# Patient Record
Sex: MALE | Race: WHITE | Employment: UNEMPLOYED | URBAN - METROPOLITAN AREA
[De-identification: names, ages, dates, MRNs, and addresses within clinical notes are randomized per-mention and may not be internally consistent; named-entity substitution may affect disease eponyms.]

---

## 2017-01-03 ENCOUNTER — TELEPHONE (OUTPATIENT)
Dept: NEUROLOGY | Facility: CLINIC | Age: 47
End: 2017-01-03

## 2017-01-03 NOTE — TELEPHONE ENCOUNTER
Patient is scheduled for an appointment regarding headaches 1/4/17.  Per Dr. Suarez's headache protocol, he does need to be seen by his primary first, and he or she should refer him.  She doesn't accept self-referrals for headaches.  I don't see that he's seen anyone in our system regarding this.      I left a message to have him return my call.

## 2017-01-26 ENCOUNTER — OFFICE VISIT (OUTPATIENT)
Dept: FAMILY MEDICINE | Facility: CLINIC | Age: 47
End: 2017-01-26
Payer: MEDICARE

## 2017-01-26 VITALS
HEIGHT: 70 IN | BODY MASS INDEX: 41.46 KG/M2 | WEIGHT: 289.6 LBS | TEMPERATURE: 96.1 F | DIASTOLIC BLOOD PRESSURE: 68 MMHG | HEART RATE: 60 BPM | SYSTOLIC BLOOD PRESSURE: 112 MMHG

## 2017-01-26 DIAGNOSIS — J01.90 ACUTE SINUSITIS WITH SYMPTOMS > 10 DAYS: ICD-10-CM

## 2017-01-26 DIAGNOSIS — G43.819 OTHER MIGRAINE WITHOUT STATUS MIGRAINOSUS, INTRACTABLE: ICD-10-CM

## 2017-01-26 DIAGNOSIS — R07.9 CHEST PAIN, UNSPECIFIED TYPE: Primary | ICD-10-CM

## 2017-01-26 DIAGNOSIS — G47.00 INSOMNIA, UNSPECIFIED TYPE: ICD-10-CM

## 2017-01-26 PROCEDURE — 99215 OFFICE O/P EST HI 40 MIN: CPT | Performed by: PHYSICIAN ASSISTANT

## 2017-01-26 RX ORDER — DIVALPROEX SODIUM 500 MG/1
TABLET, EXTENDED RELEASE ORAL
COMMUNITY
Start: 2016-11-09

## 2017-01-26 RX ORDER — ONDANSETRON 4 MG/1
4 TABLET, ORALLY DISINTEGRATING ORAL
COMMUNITY
Start: 2016-10-21

## 2017-01-26 RX ORDER — ASPIRIN 81 MG/1
81 TABLET ORAL DAILY
COMMUNITY
Start: 2017-01-26

## 2017-01-26 RX ORDER — DIVALPROEX SODIUM 500 MG/1
1500 TABLET, EXTENDED RELEASE ORAL DAILY
Qty: 90 TABLET | Refills: 3 | Status: SHIPPED | OUTPATIENT
Start: 2017-01-26

## 2017-01-26 RX ORDER — NAPROXEN 500 MG/1
500 TABLET ORAL
COMMUNITY
Start: 2016-09-24

## 2017-01-26 RX ORDER — TAMSULOSIN HYDROCHLORIDE 0.4 MG/1
0.4 CAPSULE ORAL DAILY
Qty: 60 CAPSULE | COMMUNITY
Start: 2017-01-26

## 2017-01-26 RX ORDER — LORAZEPAM 1 MG/1
1 TABLET ORAL
COMMUNITY
Start: 2016-11-09

## 2017-01-26 RX ORDER — MULTIVIT-MIN/IRON FUM/FOLIC AC 7.5 MG-4
2 TABLET ORAL
COMMUNITY

## 2017-01-26 RX ORDER — HYDROXYZINE PAMOATE 50 MG/1
50 CAPSULE ORAL
COMMUNITY
Start: 2016-11-09

## 2017-01-26 ASSESSMENT — ENCOUNTER SYMPTOMS
WHEEZING: 0
HEADACHES: 1
SPUTUM PRODUCTION: 0
PHOTOPHOBIA: 0
HALLUCINATIONS: 0
COUGH: 1
DYSURIA: 0
TREMORS: 0
CONSTIPATION: 0
PSYCHIATRIC NEGATIVE: 1
DIZZINESS: 0
CHILLS: 0
FOCAL WEAKNESS: 0
WEAKNESS: 0
STRIDOR: 0
PALPITATIONS: 0
EYE DISCHARGE: 0
EYE PAIN: 0
BLURRED VISION: 0
TINGLING: 1
FEVER: 0
ORTHOPNEA: 0
INSOMNIA: 0
DIARRHEA: 0
MYALGIAS: 0
NECK PAIN: 0
SHORTNESS OF BREATH: 0
SENSORY CHANGE: 0
NERVOUS/ANXIOUS: 0
SPEECH CHANGE: 0
DOUBLE VISION: 0
HEARTBURN: 0
NAUSEA: 0
BACK PAIN: 0
DIAPHORESIS: 0
MUSCULOSKELETAL NEGATIVE: 1
HEMOPTYSIS: 0
EYE REDNESS: 0
SORE THROAT: 0
DEPRESSION: 0
LOSS OF CONSCIOUSNESS: 0
VOMITING: 0
FREQUENCY: 0
BLOOD IN STOOL: 0
ABDOMINAL PAIN: 0
SEIZURES: 0
WEIGHT LOSS: 0

## 2017-01-26 ASSESSMENT — LIFESTYLE VARIABLES: SUBSTANCE_ABUSE: 0

## 2017-01-26 NOTE — NURSING NOTE
"Chief Complaint   Patient presents with     Heart Problem     Numbness     Establish Care     /68 mmHg  Pulse 60  Temp(Src) 96.1  F (35.6  C) (Tympanic)  Ht 5' 9.75\" (1.772 m)  Wt 289 lb 9.6 oz (131.362 kg)  BMI 41.84 kg/m2 Estimated body mass index is 41.84 kg/(m^2) as calculated from the following:    Height as of this encounter: 5' 9.75\" (1.772 m).    Weight as of this encounter: 289 lb 9.6 oz (131.362 kg).  bp completed using cuff size: large      Health Maintenance that is potentially due pending provider review:  NONE    n/a    Marleen WEN CMA    "
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
## 2017-01-26 NOTE — MR AVS SNAPSHOT
After Visit Summary   1/26/2017    Ambrose Jenkins    MRN: [id number]           Patient Information     Date Of Birth          1970        Visit Information        Provider Department      1/26/2017 3:20 PM Carrington Willingham PA-C [facility]        Today's Diagnoses     Chest pain, unspecified type    -  1     Insomnia, unspecified type         Other migraine without status migrainosus, intractable         Acute sinusitis with symptoms > 10 days            Follow-ups after your visit        Additional Services     OTOLARYNGOLOGY REFERRAL       Your provider has referred you to: [facility]: [facility] [phone]   [email or web address]    Please be aware that coverage of these services is subject to the terms and limitations of your health insurance plan.  Call member services at your health plan with any benefit or coverage questions.      Please bring the following with you to your appointment:    (1) Any X-Rays, CTs or MRIs which have been performed.  Contact the facility where they were done to arrange for  prior to your scheduled appointment.   (2) List of current medications  (3) This referral request   (4) Any documents/labs given to you for this referral            SLEEP EVALUATION & MANAGEMENT REFERRAL - ADULT       Please be aware that coverage of these services is subject to the terms and limitations of your health insurance plan.  Call member services at your health plan with any benefit or coverage questions.      Please bring the following to your appointment:    >>   List of current medications   >>   This referral request   >>   Any documents/labs given to you for this referral    [facility] Lab  Ph [phone] (Age 2 and up)                  Your next 10 appointments already scheduled     Feb 16, 2017 11:00 AM   New Visit with Kamila Suarez MD   [facility]  "   [address]  [facility] [address]   [phone]              Future tests that were ordered for you today     Open Future Orders        Priority Expected Expires Ordered    SLEEP EVALUATION & MANAGEMENT REFERRAL - ADULT Routine  1/26/2018 1/26/2017    Exercise Stress test Routine  3/12/2017 1/26/2017    Lipid Profile with reflex to direct LDL Routine  1/26/2018 1/26/2017    CBC with platelets and differential Routine  1/26/2018 1/26/2017    Comprehensive metabolic panel (BMP + Alb, Alk Phos, ALT, AST, Total. Bili, TP) Routine  1/26/2018 1/26/2017            Who to contact     If you have questions or need follow up information about today's clinic visit or your schedule please contact [facility] directly at [phone].  Normal or non-critical lab and imaging results will be communicated to you by MyChart, letter or phone within 4 business days after the clinic has received the results. If you do not hear from us within 7 days, please contact the clinic through [facility]Chart or phone. If you have a critical or abnormal lab result, we will notify you by phone as soon as possible.  Submit refill requests through [email or web address] or call your pharmacy and they will forward the refill request to us. Please allow 3 business days for your refill to be completed.          Additional Information About Your Visit        TNChart Information     [email or web address] lets you send messages to your doctor, view your test results, renew your prescriptions, schedule appointments and more. To sign up, go to [email or web address] . Click on \"Log in\" on the left side of the screen, which will take you to the Welcome page. Then click on \"Sign up Now\" on the right side of the page.     You will be asked to enter the access code listed below, as well as some personal information. Please follow the directions to create your username and password.     Your access code is: [email or web address]  Expires: 4/26/2017  4:08 PM     Your " "access code will  in 90 days. If you need help or a new code, please call your [place] clinic or [phone].        Care EveryWhere ID     This is your Care EveryWhere ID. This could be used by other organizations to access your [place] medical records  [id number]        Your Vitals Were     Pulse Temperature Height BMI (Body Mass Index)          60 96.1  F (35.6  C) (Tympanic) 5' 9.75\" (1.772 m) 41.84 kg/m2         Blood Pressure from Last 3 Encounters:   17 112/68   10/04/16 159/101   14 158/101    Weight from Last 3 Encounters:   17 289 lb 9.6 oz (131.362 kg)   03/10/16 279 lb 3.2 oz (126.644 kg)   10/10/14 252 lb (114.306 kg)              We Performed the Following     OTOLARYNGOLOGY REFERRAL          Today's Medication Changes          These changes are accurate as of: 17  4:08 PM.  If you have any questions, ask your nurse or doctor.               These medicines have changed or have updated prescriptions.        Dose/Directions    * divalproex 500 MG 24 hr tablet   Commonly known as:  DEPAKOTE ER   This may have changed:  Another medication with the same name was added. Make sure you understand how and when to take each.   Changed by:  Carrington Willingham PA-C        Refills:  0       * divalproex 500 MG 24 hr tablet   Commonly known as:  DEPAKOTE ER   This may have changed:  You were already taking a medication with the same name, and this prescription was added. Make sure you understand how and when to take each.   Used for:  Other migraine without status migrainosus, intractable   Changed by:  Carrington Willingham PA-C        Dose:  1500 mg   Take 3 tablets (1,500 mg) by mouth daily   Quantity:  90 tablet   Refills:  3       * Notice:  This list has 2 medication(s) that are the same as other medications prescribed for you. Read the directions carefully, and ask your doctor or other care provider to review them with you.         Where to get your medicines      These medications " were sent to [id number] - [place], [place] - [address] AT [facility] [address]  [address] [address]     Phone:  [phone]    - divalproex 500 MG 24 hr tablet             Primary Care Provider Office Phone # Fax #    Carrington Willingham PA-C [phone]       [facility] [address] [address]        Thank you!     Thank you for choosing [facility]  for your care. Our goal is always to provide you with excellent care. Hearing back from our patients is one way we can continue to improve our services. Please take a few minutes to complete the written survey that you may receive in the mail after your visit with us. Thank you!             Your Updated Medication List - Protect others around you: Learn how to safely use, store and throw away your medicines at www.disposemymeds.org.          This list is accurate as of: 1/26/17  4:08 PM.  Always use your most recent med list.                   Brand Name Dispense Instructions for use    aspirin EC 81 MG EC tablet      Take 1 tablet (81 mg) by mouth daily       diphenhydrAMINE 50 MG capsule    BENADRYL     Take 50 mg by mouth every 6 hours as needed       * divalproex 500 MG 24 hr tablet    DEPAKOTE ER         * divalproex 500 MG 24 hr tablet    DEPAKOTE ER    90 tablet    Take 3 tablets (1,500 mg) by mouth daily       fluticasone 50 MCG/ACT spray    FLONASE    1 Bottle    Spray 1-2 sprays into both nostrils daily       hydrOXYzine 50 MG capsule    VISTARIL     Take 50 mg by mouth       Ipratropium-Albuterol  MCG/ACT inhaler    COMBIVENT RESPIMAT     Inhale 1 puff into the lungs every 6 hours       LORazepam 1 MG tablet    ATIVAN     Take 1 mg by mouth       metoprolol 50 MG tablet    LOPRESSOR     Take 50 mg by mouth daily       MULTI-VITAMIN/MINERALS Tabs      Take 2 tablets by mouth       naproxen 500 MG tablet    NAPROSYN     Take 500 mg by mouth        ondansetron 4 MG ODT tab    ZOFRAN-ODT     Place 4 mg under the tongue       tamsulosin 0.4 MG capsule    FLOMAX    60 capsule    Take 1 capsule (0.4 mg) by mouth daily       ZANTAC PO      Take 75 mg by mouth       * Notice:  This list has 2 medication(s) that are the same as other medications prescribed for you. Read the directions carefully, and ask your doctor or other care provider to review them with you.

## 2017-01-30 PROBLEM — G43.819 OTHER MIGRAINE WITHOUT STATUS MIGRAINOSUS, INTRACTABLE: Status: ACTIVE | Noted: 2017-01-30

## 2017-02-15 ENCOUNTER — TELEPHONE (OUTPATIENT)
Dept: NEUROLOGY | Facility: CLINIC | Age: 47
End: 2017-02-15

## 2017-02-15 NOTE — TELEPHONE ENCOUNTER
PREVISIT INFORMATION                                                    Ambrose Jenkins scheduled for future visit at [facility].    Patient is scheduled to see Kamila Suarez MD on 2/16/17  Reason for visit: Headache  Referring provider unknown  Has patient seen previous specialist? Unsure  Medical Records:  Not available    REVIEW                                                          Current Outpatient Prescriptions   Medication Sig Dispense Refill     divalproex (DEPAKOTE ER) 500 MG 24 hr tablet        hydrOXYzine (VISTARIL) 50 MG capsule Take 50 mg by mouth       LORazepam (ATIVAN) 1 MG tablet Take 1 mg by mouth       Multiple Vitamins-Minerals (MULTI-VITAMIN/MINERALS) TABS Take 2 tablets by mouth       ondansetron (ZOFRAN-ODT) 4 MG ODT tab Place 4 mg under the tongue       naproxen (NAPROSYN) 500 MG tablet Take 500 mg by mouth       RaNITidine HCl (ZANTAC PO) Take 75 mg by mouth       aspirin EC 81 MG EC tablet Take 1 tablet (81 mg) by mouth daily       tamsulosin (FLOMAX) 0.4 MG capsule Take 1 capsule (0.4 mg) by mouth daily 60 capsule      divalproex (DEPAKOTE ER) 500 MG 24 hr tablet Take 3 tablets (1,500 mg) by mouth daily 90 tablet 3     metoprolol (LOPRESSOR) 50 MG tablet Take 50 mg by mouth daily       diphenhydrAMINE (BENADRYL) 50 MG capsule Take 50 mg by mouth every 6 hours as needed       Ipratropium-Albuterol (COMBIVENT RESPIMAT)  MCG/ACT inhaler Inhale 1 puff into the lungs every 6 hours       fluticasone (FLONASE) 50 MCG/ACT nasal spray Spray 1-2 sprays into both nostrils daily 1 Bottle 11       Allergies: Hydrocodone        PLAN/FOLLOW-UP NEEDED                                                        I've attempted a call to patient's only listed phone.  The message stated that the mailbox was full.  It appears he's been advised by our appointment desk of the need for referral by his primary and that we need his previous records.  However, at this time we have none of  these at our disposal.

## 2017-03-10 ENCOUNTER — TELEPHONE (OUTPATIENT)
Dept: FAMILY MEDICINE | Facility: CLINIC | Age: 47
End: 2017-03-10

## 2017-03-10 NOTE — TELEPHONE ENCOUNTER
Elsa from Cardiology/[place] says the patient's order for stress test has . She is asking if we can extend this. He is scheduled for the test 3/20/17

## 2017-04-11 ENCOUNTER — TELEPHONE (OUTPATIENT)
Dept: NURSING | Facility: CLINIC | Age: 47
End: 2017-04-11

## 2017-04-12 NOTE — TELEPHONE ENCOUNTER
Call Type: Triage Call    Presenting Problem: For the past two weeks he has done nothing but  sit on the side of the bed or lay down.  He is an alcoholic and he  had his last drink on Friday on 4/7/17.  His left lower leg is numb  and pale white and slightly swollen.  Pt is not with caller.  Triage Note:  Guideline Title: Leg Non-Injury  Recommended Disposition: See ED Immediately  Original Inclination: Would have called clinic  Override Disposition:  Intended Action: Go to Hospital / ED  Physician Contacted: No  New onset of severe pain AND change in sensation (numb, tingling, or no feeling),  change in color (pale or blue), feels cool to touch compared to other extremity.  ?  YES  Gradual onset or worsening weakness/paralysis OR inability to purposely move ? NO  Gradual onset or worsening numbness/tingling ? NO  Generalized muscle pain or aching ? NO  Painful spasms or cramping of large muscle groups (back, legs or abdomen) AND  recent heat exposure ? NO  Sudden onset of shortness of breath, chest pain and cough with blood tinged sputum  ? NO  New unexplained weakness/paralysis, change in sensation (numbness or tingling) or  inability to purposely move, especially when one side of body is involved  occurring now or within last 4 hours ? NO  Physician Instructions:  Care Advice: Another adult should drive.  Do not give the patient anything to eat or drink.  Loosen or remove tight clothing.  Avoid using or placing any weight on the affected extremity until evaluated  by a provider.  Do not massage affected area.  IMMEDIATE ACTION  Write down provider's name. List or place the following in a bag for  transport with the patient: current prescription and/or nonprescription  medications  alternative treatments, therapies and medications  and street drugs.